# Patient Record
Sex: MALE | Race: WHITE | NOT HISPANIC OR LATINO | Employment: UNEMPLOYED | URBAN - NONMETROPOLITAN AREA
[De-identification: names, ages, dates, MRNs, and addresses within clinical notes are randomized per-mention and may not be internally consistent; named-entity substitution may affect disease eponyms.]

---

## 2018-05-01 DIAGNOSIS — N42.9 DISORDER OF PROSTATE: Primary | ICD-10-CM

## 2018-05-09 ENCOUNTER — OFFICE VISIT (OUTPATIENT)
Dept: UROLOGY | Facility: CLINIC | Age: 73
End: 2018-05-09

## 2018-05-09 VITALS — HEIGHT: 69 IN | BODY MASS INDEX: 34.07 KG/M2 | WEIGHT: 230 LBS

## 2018-05-09 DIAGNOSIS — C61 PROSTATE CANCER (HCC): Primary | ICD-10-CM

## 2018-05-09 PROCEDURE — 99214 OFFICE O/P EST MOD 30 MIN: CPT | Performed by: UROLOGY

## 2018-05-09 RX ORDER — SIMVASTATIN 80 MG
TABLET ORAL
COMMUNITY
Start: 2018-03-02

## 2018-05-09 RX ORDER — LEVOFLOXACIN 500 MG/1
TABLET, FILM COATED ORAL
Qty: 3 TABLET | Refills: 0 | Status: SHIPPED | OUTPATIENT
Start: 2018-05-09 | End: 2019-06-07

## 2018-05-09 RX ORDER — ALPRAZOLAM 0.5 MG/1
TABLET ORAL
Qty: 3 TABLET | Refills: 0 | Status: SHIPPED | OUTPATIENT
Start: 2018-05-09 | End: 2019-06-07

## 2018-05-09 RX ORDER — MECLIZINE HYDROCHLORIDE 25 MG/1
25 TABLET ORAL 3 TIMES DAILY PRN
Refills: 0 | COMMUNITY
Start: 2018-04-17 | End: 2019-06-07

## 2018-05-09 RX ORDER — FINASTERIDE 5 MG/1
5 TABLET, FILM COATED ORAL DAILY
Qty: 90 TABLET | Refills: 3 | Status: SHIPPED | OUTPATIENT
Start: 2018-05-09 | End: 2018-06-08 | Stop reason: SDUPTHER

## 2018-05-09 RX ORDER — EZETIMIBE 10 MG/1
TABLET ORAL
Refills: 0 | COMMUNITY
Start: 2018-03-01

## 2018-05-09 NOTE — PROGRESS NOTES
Chief Complaint:          Prostate cancer    HPI:   73 y.o. male.  Pt has been on aggressive surveillance for prostate cancer   Pt diagnosed with prostate cancer 6 years ago by Dr. Palomino.  His last biopsies 4 years ago were negative for cnacer  His last psa was 7/2016 at 1.44  HPI  Psa 1.1 in may    Past Medical History:        Past Medical History:   Diagnosis Date   • Diabetes    • Gastric ulcer    • Glaucoma    • Hypertension    • Skin cancer          Current Meds:     Current Outpatient Prescriptions   Medication Sig Dispense Refill   • amLODIPine-benazepril (LOTREL) 10-20 MG per capsule      • BYDUREON 2 MG pen-injector      • ezetimibe (ZETIA) 10 MG tablet take 1 tablet by mouth once daily (TAKE WITH SIMVASTATIN)  0   • finasteride (PROSCAR) 5 MG tablet      • meclizine (ANTIVERT) 25 MG tablet Take 25 mg by mouth 3 (Three) Times a Day As Needed for dizziness.  0   • omeprazole (PriLOSEC) 20 MG capsule      • ONGLYZA 5 MG tablet      • pioglitazone (ACTOS) 45 MG tablet      • simvastatin (ZOCOR) 80 MG tablet      • vitamin D (ERGOCALCIFEROL) 91814 UNITS capsule capsule      • VYTORIN 10-80 MG per tablet        No current facility-administered medications for this visit.         Allergies:      No Known Allergies     Past Surgical History:     Past Surgical History:   Procedure Laterality Date   • HAND SURGERY     • HERNIA REPAIR     • NOSE SURGERY           Social History:     Social History     Social History   • Marital status:      Spouse name: N/A   • Number of children: N/A   • Years of education: N/A     Occupational History   • Not on file.     Social History Main Topics   • Smoking status: Never Smoker   • Smokeless tobacco: Never Used   • Alcohol use No   • Drug use: No   • Sexual activity: Not on file     Other Topics Concern   • Not on file     Social History Narrative   • No narrative on file       Family History:     Family History   Problem Relation Age of Onset   • No Known Problems Father     • No Known Problems Mother        Review of Systems:     Review of Systems   Constitutional: Negative for chills, fatigue and fever.   Respiratory: Positive for shortness of breath. Negative for cough and wheezing.    Cardiovascular: Negative for leg swelling.   Gastrointestinal: Negative for abdominal pain, nausea and vomiting.   Musculoskeletal: Negative for back pain and joint swelling.   Neurological: Negative for dizziness and headaches.   Psychiatric/Behavioral: Negative for confusion.       IPSS Questionnaire (AUA-7):  Over the past month…    1)  How often have you had a sensation of not emptying your bladder completely after you finish urinating?  2 - Less than half the time   2)  How often have you had to urinate again less than two hours after you finished urinating? 4 - More than half the time   3)  How often have you found you stopped and started again several times when you urinated?  2 - Less than half the time   4) How difficult have you found it to postpone urination?  0 - Not at all   5) How often have you had a weak urinary stream?  1 - Less than 1 time in 5   6) How often have you had to push or strain to begin urination?  0 - Not at all   7) How many times did you most typically get up to urinate from the time you went to bed until the time you got up in the morning?  1 - 1 time   Total Score:  10     I have reviewed the follow portions of the patient's history and confirmed they are accurate today:  allergies, current medications, past family history, past medical history, past social history, past surgical history, problem list and ROS  Physical Exam:     Physical Exam   Constitutional: He is oriented to person, place, and time.   HENT:   Head: Normocephalic and atraumatic.   Right Ear: External ear normal.   Left Ear: External ear normal.   Nose: Nose normal.   Mouth/Throat: Oropharynx is clear and moist.   Eyes: Conjunctivae and EOM are normal. Pupils are equal, round, and reactive to  "light.   Neck: Normal range of motion. Neck supple. No thyromegaly present.   Cardiovascular: Normal rate, regular rhythm, normal heart sounds and intact distal pulses.    No murmur heard.  Pulmonary/Chest: Effort normal and breath sounds normal. No respiratory distress. He has no wheezes. He has no rales. He exhibits no tenderness.   Abdominal: Soft. Bowel sounds are normal. He exhibits no distension and no mass. There is no tenderness. No hernia.   Genitourinary: Rectum normal, prostate normal and penis normal.   Genitourinary Comments: No nodules   Musculoskeletal: Normal range of motion. He exhibits no edema or tenderness.   Lymphadenopathy:     He has no cervical adenopathy.   Neurological: He is alert and oriented to person, place, and time. No cranial nerve deficit. He exhibits normal muscle tone. Coordination normal.   Skin: Skin is warm. No rash noted.   Psychiatric: He has a normal mood and affect. His behavior is normal. Judgment and thought content normal.   Nursing note and vitals reviewed.      Ht 175.3 cm (69\")   Wt 104 kg (230 lb)   BMI 33.97 kg/m²    Procedure:         Assessment:     Encounter Diagnosis   Name Primary?   • Prostate cancer Yes     Orders Placed This Encounter   Procedures   • PSA DIAGNOSTIC     Standing Status:   Future     Standing Expiration Date:   5/9/2019       Plan:   I would recommend psa today and it is time to repeat his prostate ultrasound and biopsies.    Patient's Body mass index is 33.97 kg/m². BMI is above normal parameters. Recommendations include: educational material.      Counseling was given to patient for the following topics instructions for management as follows: prostate cancer. The interim medical history and current results were reviewed.  A treatment plan with follow-up was made for Prostate cancer [C61]. I spent 31 minutes face to face with Troy Murguia and 70 percentage was spent in counseling.    This document has been electronically signed by Bob " MD Mehrdad May 9, 2018 1:26 PM

## 2018-05-14 ENCOUNTER — RESULTS ENCOUNTER (OUTPATIENT)
Dept: UROLOGY | Facility: CLINIC | Age: 73
End: 2018-05-14

## 2018-05-14 DIAGNOSIS — C61 PROSTATE CANCER (HCC): ICD-10-CM

## 2018-06-01 ENCOUNTER — PROCEDURE VISIT (OUTPATIENT)
Dept: UROLOGY | Facility: CLINIC | Age: 73
End: 2018-06-01

## 2018-06-01 VITALS — HEIGHT: 69 IN | BODY MASS INDEX: 34.07 KG/M2 | WEIGHT: 230 LBS

## 2018-06-01 DIAGNOSIS — C61 PROSTATE CANCER (HCC): ICD-10-CM

## 2018-06-01 DIAGNOSIS — Z48.816 SURGICAL AFTERCARE, GENITOURINARY SYSTEM: Primary | ICD-10-CM

## 2018-06-01 PROCEDURE — 76872 US TRANSRECTAL: CPT | Performed by: UROLOGY

## 2018-06-01 PROCEDURE — 96372 THER/PROPH/DIAG INJ SC/IM: CPT | Performed by: UROLOGY

## 2018-06-01 PROCEDURE — 55700 PR PROSTATE NEEDLE BIOPSY ANY APPROACH: CPT | Performed by: UROLOGY

## 2018-06-01 PROCEDURE — 76942 ECHO GUIDE FOR BIOPSY: CPT | Performed by: UROLOGY

## 2018-06-01 RX ORDER — CEFTRIAXONE 1 G/1
1 INJECTION, POWDER, FOR SOLUTION INTRAMUSCULAR; INTRAVENOUS ONCE
Status: COMPLETED | OUTPATIENT
Start: 2018-06-01 | End: 2018-06-01

## 2018-06-01 RX ADMIN — CEFTRIAXONE 1 G: 1 INJECTION, POWDER, FOR SOLUTION INTRAMUSCULAR; INTRAVENOUS at 13:12

## 2018-06-01 NOTE — PROGRESS NOTES
Chief Complaint:          Prostate cancer    HPI:   73 y.o. male.    HPI  The patient is here for prostate ultrasound and prostate biopsies for regular surveillance of his localized prostate cancer.  Pt diagnosed with prostate cancer 6 years ago by Dr. Palomino.  His last biopsies 4 years ago were negative for cnacer  His last psa was 7/2016 at 1.44  Psa 1.1 in may    Past Medical History:        Past Medical History:   Diagnosis Date   • Diabetes    • Gastric ulcer    • Glaucoma    • Hypertension    • Skin cancer          Current Meds:     Current Outpatient Prescriptions   Medication Sig Dispense Refill   • ALPRAZolam (XANAX) 0.5 MG tablet Bring to office for procedure. Staff will instruct on dose and timing. 3 tablet 0   • amLODIPine-benazepril (LOTREL) 10-20 MG per capsule      • BYDUREON 2 MG pen-injector      • ezetimibe (ZETIA) 10 MG tablet take 1 tablet by mouth once daily (TAKE WITH SIMVASTATIN)  0   • finasteride (PROSCAR) 5 MG tablet Take 1 tablet by mouth Daily. 90 tablet 3   • levoFLOXacin (LEVAQUIN) 500 MG tablet Take 1 tablet by mouth daily for three days starting the day prior to procedure. 3 tablet 0   • meclizine (ANTIVERT) 25 MG tablet Take 25 mg by mouth 3 (Three) Times a Day As Needed for dizziness.  0   • omeprazole (PriLOSEC) 20 MG capsule      • ONGLYZA 5 MG tablet      • pioglitazone (ACTOS) 45 MG tablet      • simvastatin (ZOCOR) 80 MG tablet      • vitamin D (ERGOCALCIFEROL) 34053 UNITS capsule capsule      • VYTORIN 10-80 MG per tablet        No current facility-administered medications for this visit.         Allergies:      No Known Allergies     Past Surgical History:     Past Surgical History:   Procedure Laterality Date   • HAND SURGERY     • HERNIA REPAIR     • NOSE SURGERY           Social History:     Social History     Social History   • Marital status:      Spouse name: N/A   • Number of children: N/A   • Years of education: N/A     Occupational History   • Not on file.      Social History Main Topics   • Smoking status: Never Smoker   • Smokeless tobacco: Never Used   • Alcohol use No   • Drug use: No   • Sexual activity: Not on file     Other Topics Concern   • Not on file     Social History Narrative   • No narrative on file       Family History:     Family History   Problem Relation Age of Onset   • No Known Problems Father    • No Known Problems Mother        Review of Systems:     Review of Systems   Constitutional: Negative for chills and fever.   Respiratory: Negative for chest tightness, shortness of breath and wheezing.    Cardiovascular: Negative for chest pain.   Gastrointestinal: Negative for abdominal pain, nausea and vomiting.   Genitourinary: Negative for difficulty urinating, flank pain, frequency and urgency.   Musculoskeletal: Negative for back pain.   Neurological: Negative for dizziness, weakness and numbness.   Psychiatric/Behavioral: Negative for confusion.       I have reviewed the follow portions of the patient's history and confirmed they are accurate today:  allergies, current medications, past family history, past medical history, past social history, past surgical history, problem list and ROS  Physical Exam:     Physical Exam   Constitutional: He is oriented to person, place, and time.   HENT:   Head: Normocephalic and atraumatic.   Right Ear: External ear normal.   Left Ear: External ear normal.   Nose: Nose normal.   Mouth/Throat: Oropharynx is clear and moist.   Eyes: Conjunctivae and EOM are normal. Pupils are equal, round, and reactive to light.   Neck: Normal range of motion. Neck supple. No thyromegaly present.   Cardiovascular: Normal rate, regular rhythm, normal heart sounds and intact distal pulses.    No murmur heard.  Pulmonary/Chest: Effort normal and breath sounds normal. No respiratory distress. He has no wheezes. He has no rales. He exhibits no tenderness.   Abdominal: Soft. Bowel sounds are normal. He exhibits no distension and no mass.  "There is no tenderness. No hernia.   Genitourinary: Rectum normal, prostate normal and penis normal.   Musculoskeletal: Normal range of motion. He exhibits no edema or tenderness.   Lymphadenopathy:     He has no cervical adenopathy.   Neurological: He is alert and oriented to person, place, and time. No cranial nerve deficit. He exhibits normal muscle tone. Coordination normal.   Skin: Skin is warm. No rash noted.   Psychiatric: He has a normal mood and affect. His behavior is normal. Judgment and thought content normal.   Nursing note and vitals reviewed.      Ht 175.3 cm (69.02\")   Wt 104 kg (230 lb)   BMI 33.95 kg/m²    Procedure:   Procedure: Transrectal Ultrasound and Guided Biopsies    Position: Fetal/left lateral decubitus    Prostate Ultrasound Guided lidocaine prostate block    Sedation: Oral xanax    Indications: Stage TIc prostate cancer    Procedures risks and benefits and risk and alternatives were discussed with the patient. Written Consent was obtained prior to procedure.    In patients without penicillin allergies, preoperative Oral antibiotics and pre-procedure rocephin administered.    Procedure details: 8 Mhz biplanar B&K probe was placed in the rectum. Prostate was scanned from the base of the bladder to the apex. Prostate size was measured at 45.4 cc prosthetic height 35.8  mm   50.42 width 48.4 mm   Length 48.4 mm    Seminal vesicals: normal  Prostate median lobe normal    Patient is to return in 1  week to discuss pathology.          Assessment:     Encounter Diagnoses   Name Primary?   • Surgical aftercare, genitourinary system Yes   • Prostate cancer      No orders of the defined types were placed in this encounter.      Plan:   See him back in a week to discuss his pathology    Patient's Body mass index is 33.95 kg/m². BMI is above normal parameters. Recommendations include: educational material.      Counseling was given to patient for the following topics diagnostic results including: " Prostate ultrasound. The interim medical history and current results were reviewed.  A treatment plan with follow-up was made for Surgical aftercare, genitourinary system [Z48.816].This document has been electronically signed by Bob Cronin MD June 1, 2018 2:05 PM

## 2018-06-08 ENCOUNTER — OFFICE VISIT (OUTPATIENT)
Dept: UROLOGY | Facility: CLINIC | Age: 73
End: 2018-06-08

## 2018-06-08 VITALS — HEIGHT: 69 IN | BODY MASS INDEX: 33.96 KG/M2 | WEIGHT: 229.28 LBS

## 2018-06-08 DIAGNOSIS — C61 PROSTATE CANCER (HCC): Primary | ICD-10-CM

## 2018-06-08 PROCEDURE — 99214 OFFICE O/P EST MOD 30 MIN: CPT | Performed by: UROLOGY

## 2018-06-08 RX ORDER — FINASTERIDE 5 MG/1
5 TABLET, FILM COATED ORAL DAILY
Qty: 90 TABLET | Refills: 3 | Status: SHIPPED | OUTPATIENT
Start: 2018-06-08 | End: 2019-06-07 | Stop reason: SDUPTHER

## 2018-06-08 NOTE — PROGRESS NOTES
Chief Complaint:          Prostate cancer    HPI:   73 y.o. male.  Pt has been diagnosed with small amount of prostate cancer 6 years ago.  His biopsies the second time with us that have not shown any cancer.  His psa 1.1 the last time.  Pt is on finasteride.  HPI      Past Medical History:        Past Medical History:   Diagnosis Date   • Diabetes    • Gastric ulcer    • Glaucoma    • Hypertension    • Skin cancer          Current Meds:     Current Outpatient Prescriptions   Medication Sig Dispense Refill   • ALPRAZolam (XANAX) 0.5 MG tablet Bring to office for procedure. Staff will instruct on dose and timing. 3 tablet 0   • amLODIPine-benazepril (LOTREL) 10-20 MG per capsule      • BYDUREON 2 MG pen-injector      • ezetimibe (ZETIA) 10 MG tablet take 1 tablet by mouth once daily (TAKE WITH SIMVASTATIN)  0   • finasteride (PROSCAR) 5 MG tablet Take 1 tablet by mouth Daily. 90 tablet 3   • levoFLOXacin (LEVAQUIN) 500 MG tablet Take 1 tablet by mouth daily for three days starting the day prior to procedure. 3 tablet 0   • meclizine (ANTIVERT) 25 MG tablet Take 25 mg by mouth 3 (Three) Times a Day As Needed for dizziness.  0   • omeprazole (PriLOSEC) 20 MG capsule      • ONGLYZA 5 MG tablet      • pioglitazone (ACTOS) 45 MG tablet      • simvastatin (ZOCOR) 80 MG tablet      • vitamin D (ERGOCALCIFEROL) 72263 UNITS capsule capsule      • VYTORIN 10-80 MG per tablet        No current facility-administered medications for this visit.         Allergies:      No Known Allergies     Past Surgical History:     Past Surgical History:   Procedure Laterality Date   • HAND SURGERY     • HERNIA REPAIR     • NOSE SURGERY           Social History:     Social History     Social History   • Marital status:      Spouse name: N/A   • Number of children: N/A   • Years of education: N/A     Occupational History   • Not on file.     Social History Main Topics   • Smoking status: Never Smoker   • Smokeless tobacco: Never Used   •  Alcohol use No   • Drug use: No   • Sexual activity: Not on file     Other Topics Concern   • Not on file     Social History Narrative   • No narrative on file       Family History:     Family History   Problem Relation Age of Onset   • No Known Problems Father    • No Known Problems Mother        Review of Systems:     Review of Systems   Constitutional: Negative for chills and fever.   HENT: Negative for congestion and sinus pressure.    Respiratory: Negative for chest tightness and shortness of breath.    Cardiovascular: Negative for chest pain and palpitations.   Gastrointestinal: Negative for constipation, nausea and vomiting.   Genitourinary: Negative for flank pain and penile pain.   Neurological: Negative for headaches.   Psychiatric/Behavioral: Negative for confusion.           I have reviewed the follow portions of the patient's history and confirmed they are accurate today:  allergies, current medications, past family history, past medical history, past social history, past surgical history, problem list and ROS  Physical Exam:     Physical Exam   Constitutional: He is oriented to person, place, and time.   HENT:   Head: Normocephalic and atraumatic.   Right Ear: External ear normal.   Left Ear: External ear normal.   Nose: Nose normal.   Mouth/Throat: Oropharynx is clear and moist.   Eyes: Conjunctivae and EOM are normal. Pupils are equal, round, and reactive to light.   Neck: Normal range of motion. Neck supple. No thyromegaly present.   Cardiovascular: Normal rate, regular rhythm, normal heart sounds and intact distal pulses.    No murmur heard.  Pulmonary/Chest: Effort normal and breath sounds normal. No respiratory distress. He has no wheezes. He has no rales. He exhibits no tenderness.   Abdominal: Soft. Bowel sounds are normal. He exhibits no distension and no mass. There is no tenderness. No hernia.   Genitourinary: Rectum normal, prostate normal and penis normal.   Musculoskeletal: Normal range  "of motion. He exhibits no edema or tenderness.   Lymphadenopathy:     He has no cervical adenopathy.   Neurological: He is alert and oriented to person, place, and time. No cranial nerve deficit. He exhibits normal muscle tone. Coordination normal.   Skin: Skin is warm. No rash noted.   Psychiatric: He has a normal mood and affect. His behavior is normal. Judgment and thought content normal.   Nursing note and vitals reviewed.      Ht 175.3 cm (69.02\")   Wt 104 kg (229 lb 4.5 oz)   BMI 33.84 kg/m²    Procedure:         Assessment:     Encounter Diagnosis   Name Primary?   • Prostate cancer Yes     No orders of the defined types were placed in this encounter.      Plan:   Will see pt back in a year with a psa prior    Patient's Body mass index is 33.84 kg/m². BMI is above normal parameters. Recommendations include: educational material.      Counseling was given to patient for the following topics diagnostic results including: biopsies. The interim medical history and current results were reviewed.  A treatment plan with follow-up was made for Prostate cancer [C61].This document has been electronically signed by Bob Cronin MD June 8, 2018 3:50 PM  "

## 2019-05-20 ENCOUNTER — LAB (OUTPATIENT)
Dept: UROLOGY | Facility: CLINIC | Age: 74
End: 2019-05-20

## 2019-05-20 DIAGNOSIS — C61 PROSTATE CANCER (HCC): ICD-10-CM

## 2019-05-20 PROCEDURE — 36415 COLL VENOUS BLD VENIPUNCTURE: CPT | Performed by: UROLOGY

## 2019-05-21 LAB — PSA SERPL-MCNC: 1.59 NG/ML (ref 0–4)

## 2019-06-07 ENCOUNTER — OFFICE VISIT (OUTPATIENT)
Dept: UROLOGY | Facility: CLINIC | Age: 74
End: 2019-06-07

## 2019-06-07 VITALS — HEIGHT: 69 IN | WEIGHT: 229 LBS | BODY MASS INDEX: 33.92 KG/M2

## 2019-06-07 DIAGNOSIS — C61 PROSTATE CANCER (HCC): ICD-10-CM

## 2019-06-07 PROCEDURE — 99213 OFFICE O/P EST LOW 20 MIN: CPT | Performed by: UROLOGY

## 2019-06-07 RX ORDER — FINASTERIDE 5 MG/1
5 TABLET, FILM COATED ORAL DAILY
Qty: 90 TABLET | Refills: 3 | Status: SHIPPED | OUTPATIENT
Start: 2019-06-07 | End: 2020-06-30 | Stop reason: SDUPTHER

## 2019-06-07 RX ORDER — ASPIRIN 81 MG/1
81 TABLET ORAL DAILY
COMMUNITY

## 2019-06-07 NOTE — PROGRESS NOTES
Chief Complaint:          Prostate cancer    HPI:   74 y.o. male.  Pt has been diagnosed with small amount of prostate cancer 6 years ago.  His biopsies the second time with us that have not shown any cancer.  His psa 1.1 the last time.  Pt is on finasteride.  His psa is 1.59.      HPI      Past Medical History:        Past Medical History:   Diagnosis Date   • Diabetes (CMS/HCC)    • Gastric ulcer    • Glaucoma    • Hypertension    • Skin cancer          Current Meds:     Current Outpatient Medications   Medication Sig Dispense Refill   • amLODIPine-benazepril (LOTREL) 10-20 MG per capsule      • aspirin 81 MG EC tablet Take 81 mg by mouth Daily.     • BYDUREON 2 MG pen-injector      • ezetimibe (ZETIA) 10 MG tablet take 1 tablet by mouth once daily (TAKE WITH SIMVASTATIN)  0   • finasteride (PROSCAR) 5 MG tablet Take 1 tablet by mouth Daily. 90 tablet 3   • omeprazole (PriLOSEC) 20 MG capsule      • ONGLYZA 5 MG tablet      • pioglitazone (ACTOS) 45 MG tablet      • simvastatin (ZOCOR) 80 MG tablet      • vitamin D (ERGOCALCIFEROL) 60238 UNITS capsule capsule        No current facility-administered medications for this visit.         Allergies:      No Known Allergies     Past Surgical History:     Past Surgical History:   Procedure Laterality Date   • HAND SURGERY     • HERNIA REPAIR     • NOSE SURGERY           Social History:     Social History     Socioeconomic History   • Marital status:      Spouse name: Not on file   • Number of children: Not on file   • Years of education: Not on file   • Highest education level: Not on file   Tobacco Use   • Smoking status: Never Smoker   • Smokeless tobacco: Never Used   Substance and Sexual Activity   • Alcohol use: No   • Drug use: No       Family History:     Family History   Problem Relation Age of Onset   • No Known Problems Father    • No Known Problems Mother        Review of Systems:     Review of Systems   Constitutional: Negative for chills, fatigue and  fever.   HENT: Negative for congestion and sinus pressure.    Respiratory: Negative for shortness of breath.    Cardiovascular: Negative for chest pain.   Gastrointestinal: Negative for abdominal pain, constipation, diarrhea, nausea and vomiting.   Genitourinary: Negative for frequency and urgency.   Musculoskeletal: Negative for back pain and neck pain.   Neurological: Negative for dizziness and headaches.   Hematological: Does not bruise/bleed easily.   Psychiatric/Behavioral: The patient is not nervous/anxious.          I have reviewed the follow portions of the patient's history and confirmed they are accurate today:  allergies, current medications, past family history, past medical history, past social history, past surgical history, problem list and ROS  Physical Exam:     Physical Exam   Constitutional: He is oriented to person, place, and time.   HENT:   Head: Normocephalic and atraumatic.   Right Ear: External ear normal.   Left Ear: External ear normal.   Nose: Nose normal.   Mouth/Throat: Oropharynx is clear and moist.   Eyes: Conjunctivae and EOM are normal. Pupils are equal, round, and reactive to light.   Neck: Normal range of motion. Neck supple. No thyromegaly present.   Cardiovascular: Normal rate, regular rhythm, normal heart sounds and intact distal pulses.   No murmur heard.  Pulmonary/Chest: Effort normal and breath sounds normal. No respiratory distress. He has no wheezes. He has no rales. He exhibits no tenderness.   Abdominal: Soft. Bowel sounds are normal. He exhibits no distension and no mass. There is no tenderness. No hernia.   Genitourinary: Rectum normal, prostate normal and penis normal.   Musculoskeletal: Normal range of motion. He exhibits no edema or tenderness.   Lymphadenopathy:     He has no cervical adenopathy.   Neurological: He is alert and oriented to person, place, and time. No cranial nerve deficit. He exhibits normal muscle tone. Coordination normal.   Skin: Skin is warm.  "No rash noted.   Psychiatric: He has a normal mood and affect. His behavior is normal. Judgment and thought content normal.   Nursing note and vitals reviewed.      Ht 175.3 cm (69\")   Wt 104 kg (229 lb)   BMI 33.82 kg/m²    Procedure:         Assessment:     Encounter Diagnosis   Name Primary?   • Prostate cancer (CMS/[facility])        Orders Placed This Encounter   Procedures   • PSA DIAGNOSTIC     Standing Status:   Future     Standing Expiration Date:   6/7/2020       Patient reports that he is not currently experiencing any symptoms of urinary incontinence.      Plan:   Will see pt back in a year.    Patient's Body mass index is 33.82 kg/m². BMI is within normal parameters. No follow-up required..    Counseling was given to patient for the following topics diagnostic results including: psa. The interim medical history and current results were reviewed.  A treatment plan with follow-up was made for No primary diagnosis found..     This document has been electronically signed by Bob Cronin MD June 7, 2019 3:57 PM      "

## 2020-06-30 ENCOUNTER — OFFICE VISIT (OUTPATIENT)
Dept: UROLOGY | Facility: CLINIC | Age: 75
End: 2020-06-30

## 2020-06-30 VITALS — HEIGHT: 69 IN | TEMPERATURE: 97.9 F | WEIGHT: 220 LBS | BODY MASS INDEX: 32.58 KG/M2

## 2020-06-30 DIAGNOSIS — R35.0 FREQUENT URINATION: ICD-10-CM

## 2020-06-30 DIAGNOSIS — C61 PROSTATE CANCER (HCC): Primary | ICD-10-CM

## 2020-06-30 LAB
BILIRUB BLD-MCNC: NEGATIVE MG/DL
CLARITY, POC: CLEAR
COLOR UR: YELLOW
GLUCOSE UR STRIP-MCNC: NEGATIVE MG/DL
KETONES UR QL: NEGATIVE
LEUKOCYTE EST, POC: ABNORMAL
NITRITE UR-MCNC: NEGATIVE MG/ML
PH UR: 5 [PH] (ref 5–8)
PROT UR STRIP-MCNC: ABNORMAL MG/DL
RBC # UR STRIP: NEGATIVE /UL
SP GR UR: 1.03 (ref 1–1.03)
UROBILINOGEN UR QL: NORMAL

## 2020-06-30 PROCEDURE — 99214 OFFICE O/P EST MOD 30 MIN: CPT | Performed by: UROLOGY

## 2020-06-30 PROCEDURE — 36415 COLL VENOUS BLD VENIPUNCTURE: CPT | Performed by: UROLOGY

## 2020-06-30 PROCEDURE — 81003 URINALYSIS AUTO W/O SCOPE: CPT | Performed by: UROLOGY

## 2020-06-30 RX ORDER — FINASTERIDE 5 MG/1
5 TABLET, FILM COATED ORAL DAILY
Qty: 90 TABLET | Refills: 3 | Status: SHIPPED | OUTPATIENT
Start: 2020-06-30 | End: 2021-06-29 | Stop reason: SDUPTHER

## 2020-06-30 NOTE — PROGRESS NOTES
Chief Complaint:          Prostate Cancer    HPI:   75 y.o. male.  Pt has not had a psa since last year when it was 1.59.    Pt has been diagnosed with small amount of prostate cancer 6 years ago.  His biopsies the second time with us that have not shown any cancer.  His psa 1.1 the last time.  Pt is on finasteride.  His psa is 1.59.  HPI  He has no pain and nocturia x 1 and his urine shows a trace leukocyte esterase.    Past Medical History:        Past Medical History:   Diagnosis Date   • Diabetes (CMS/HCC)    • Gastric ulcer    • Glaucoma    • Hypertension    • Skin cancer          Current Meds:     Current Outpatient Medications   Medication Sig Dispense Refill   • amLODIPine-benazepril (LOTREL) 10-20 MG per capsule      • aspirin 81 MG EC tablet Take 81 mg by mouth Daily.     • BYDUREON 2 MG pen-injector      • ezetimibe (ZETIA) 10 MG tablet take 1 tablet by mouth once daily (TAKE WITH SIMVASTATIN)  0   • finasteride (PROSCAR) 5 MG tablet Take 1 tablet by mouth Daily. 90 tablet 3   • omeprazole (PriLOSEC) 20 MG capsule      • ONGLYZA 5 MG tablet      • pioglitazone (ACTOS) 45 MG tablet      • simvastatin (ZOCOR) 80 MG tablet      • vitamin D (ERGOCALCIFEROL) 78489 UNITS capsule capsule        No current facility-administered medications for this visit.         Allergies:      No Known Allergies     Past Surgical History:     Past Surgical History:   Procedure Laterality Date   • HAND SURGERY     • HERNIA REPAIR     • NOSE SURGERY           Social History:     Social History     Socioeconomic History   • Marital status:      Spouse name: Not on file   • Number of children: Not on file   • Years of education: Not on file   • Highest education level: Not on file   Tobacco Use   • Smoking status: Never Smoker   • Smokeless tobacco: Never Used   Substance and Sexual Activity   • Alcohol use: No   • Drug use: No       Family History:     Family History   Problem Relation Age of Onset   • No Known Problems  Father    • No Known Problems Mother        Review of Systems:     Review of Systems   Constitutional: Negative.    HENT: Negative.    Eyes: Negative.    Respiratory: Negative.    Cardiovascular: Negative.    Gastrointestinal: Negative.    Endocrine: Negative.    Genitourinary: Negative.  Negative for decreased urine volume, difficulty urinating, discharge, dysuria, enuresis, flank pain, frequency, genital sores, hematuria, penile pain, penile swelling, scrotal swelling, testicular pain and urgency.   Musculoskeletal: Negative.    Skin: Negative.    Allergic/Immunologic: Negative.    Neurological: Negative.    Hematological: Negative.    Psychiatric/Behavioral: Negative.        IPSS Questionnaire (AUA-7):  Over the past month…    1)  Incomplete Emptying  How often have you had a sensation of not emptying your bladder?  0 - Not at all   2)  Frequency  How often have you had to urinate less than every two hours? 0 - Not at all   3)  Intermittency  How often have you found you stopped and started again several times when you urinated?  0 - Not at all   4) Urgency  How often have you found it difficult to postpone urination?  0 - Not at all   5) Weak Stream  How often have you had a weak urinary stream?  0 - Not at all   6) Straining  How often have you had to push or strain to begin urination?  0 - Not at all   7) Nocturia  How many times did you typically get up at night to urinate?  1 - 1 time   Total Score:  1       Quality of life due to urinary symptoms:  If you were to spend the rest of your life with your urinary condition the way it is now, how would you feel about that? 0-Delighted   Urine Leakage (Incontinence) 0-No Leakage       I have reviewed the follow portions of the patient's history and confirmed they are accurate today:  allergies, current medications, past family history, past medical history, past social history, past surgical history, problem list and ROS  Physical Exam:     Physical Exam  "  Constitutional: He is oriented to person, place, and time.   HENT:   Head: Normocephalic and atraumatic.   Right Ear: External ear normal.   Left Ear: External ear normal.   Nose: Nose normal.   Mouth/Throat: Oropharynx is clear and moist.   Eyes: Pupils are equal, round, and reactive to light. Conjunctivae and EOM are normal.   Neck: Normal range of motion. Neck supple. No thyromegaly present.   Cardiovascular: Normal rate, regular rhythm, normal heart sounds and intact distal pulses.   No murmur heard.  Pulmonary/Chest: Effort normal and breath sounds normal. No respiratory distress. He has no wheezes. He has no rales. He exhibits no tenderness.   Abdominal: Soft. Bowel sounds are normal. He exhibits no distension and no mass. There is no tenderness. No hernia.   Genitourinary: Rectum normal, prostate normal and penis normal.   Genitourinary Comments: No nodules   Musculoskeletal: Normal range of motion. He exhibits no edema or tenderness.   Lymphadenopathy:     He has no cervical adenopathy.   Neurological: He is alert and oriented to person, place, and time. No cranial nerve deficit. He exhibits normal muscle tone. Coordination normal.   Skin: Skin is warm. No rash noted.   Psychiatric: He has a normal mood and affect. His behavior is normal. Judgment and thought content normal.   Nursing note and vitals reviewed.      Temp 97.9 °F (36.6 °C)   Ht 175.3 cm (69\")   Wt 99.8 kg (220 lb)   BMI 32.49 kg/m²    Procedure:         Assessment:     Encounter Diagnoses   Name Primary?   • Prostate cancer (CMS/[facility]) Yes   • Frequent urination         Orders Placed This Encounter   Procedures   • Urine Culture - Urine, Urine, Random Void   • POC Urinalysis Dipstick, Automated       Patient reports that he is not currently experiencing any symptoms of urinary incontinence.      Plan:   Will check his psa today.  Will refill his medicatioins.    Patient's Body mass index is 32.49 kg/m². BMI is above normal parameters. " Recommendations include: referral to primary care.      Smoking Cessation Counseling:  Former smoker.  Patient does not currently use any tobacco products.   Counseling was given to patient for the following topics impressions as follows: prostate cancer. The interim medical history and current results were reviewed.  A treatment plan with follow-up was made for Prostate cancer (CMS/[facility]) [C61]. I spent 25 minutes face to face with Troy Murguia and 80 percentage was spent in counseling.       This document has been electronically signed by oBb Cronin MD June 30, 2020 11:12

## 2020-07-01 LAB — PSA SERPL-MCNC: 1.52 NG/ML (ref 0–4)

## 2020-07-03 LAB
BACTERIA UR CULT: NO GROWTH
BACTERIA UR CULT: NORMAL

## 2021-02-12 DIAGNOSIS — Z23 IMMUNIZATION DUE: ICD-10-CM

## 2021-06-16 ENCOUNTER — LAB (OUTPATIENT)
Dept: UROLOGY | Facility: CLINIC | Age: 76
End: 2021-06-16

## 2021-06-16 DIAGNOSIS — C61 PROSTATE CANCER (HCC): Primary | ICD-10-CM

## 2021-06-16 PROCEDURE — 36415 COLL VENOUS BLD VENIPUNCTURE: CPT | Performed by: UROLOGY

## 2021-06-17 LAB — PSA SERPL-MCNC: 2 NG/ML (ref 0–4)

## 2021-06-29 ENCOUNTER — OFFICE VISIT (OUTPATIENT)
Dept: UROLOGY | Facility: CLINIC | Age: 76
End: 2021-06-29

## 2021-06-29 VITALS — HEIGHT: 69 IN | BODY MASS INDEX: 32.59 KG/M2 | WEIGHT: 220.02 LBS

## 2021-06-29 DIAGNOSIS — Z77.098 AGENT ORANGE EXPOSURE: ICD-10-CM

## 2021-06-29 DIAGNOSIS — C61 PROSTATE CANCER (HCC): ICD-10-CM

## 2021-06-29 DIAGNOSIS — R97.20 ELEVATED PROSTATE SPECIFIC ANTIGEN (PSA): Primary | ICD-10-CM

## 2021-06-29 PROCEDURE — 99214 OFFICE O/P EST MOD 30 MIN: CPT | Performed by: UROLOGY

## 2021-06-29 RX ORDER — FINASTERIDE 5 MG/1
5 TABLET, FILM COATED ORAL DAILY
Qty: 90 TABLET | Refills: 3 | Status: SHIPPED | OUTPATIENT
Start: 2021-06-29 | End: 2022-06-28 | Stop reason: SDUPTHER

## 2021-07-01 PROBLEM — Z77.098 AGENT ORANGE EXPOSURE: Status: ACTIVE | Noted: 2021-07-01

## 2022-06-06 ENCOUNTER — LAB (OUTPATIENT)
Dept: UROLOGY | Facility: CLINIC | Age: 77
End: 2022-06-06

## 2022-06-06 DIAGNOSIS — C61 PROSTATE CANCER: Primary | ICD-10-CM

## 2022-06-06 LAB — PSA SERPL-MCNC: 1.35 NG/ML (ref 0–4)

## 2022-06-28 ENCOUNTER — OFFICE VISIT (OUTPATIENT)
Dept: UROLOGY | Facility: CLINIC | Age: 77
End: 2022-06-28

## 2022-06-28 VITALS — BODY MASS INDEX: 32.59 KG/M2 | WEIGHT: 220.02 LBS | HEIGHT: 69 IN

## 2022-06-28 DIAGNOSIS — C61 MALIGNANT NEOPLASM OF PROSTATE: Primary | ICD-10-CM

## 2022-06-28 DIAGNOSIS — C61 PROSTATE CANCER: ICD-10-CM

## 2022-06-28 PROCEDURE — 99213 OFFICE O/P EST LOW 20 MIN: CPT | Performed by: UROLOGY

## 2022-06-28 RX ORDER — FINASTERIDE 5 MG/1
5 TABLET, FILM COATED ORAL DAILY
Qty: 90 TABLET | Refills: 3 | Status: SHIPPED | OUTPATIENT
Start: 2022-06-28

## 2024-06-24 ENCOUNTER — TELEPHONE (OUTPATIENT)
Dept: UROLOGY | Facility: CLINIC | Age: 79
End: 2024-06-24
Payer: MEDICARE

## 2024-06-24 DIAGNOSIS — C61 PROSTATE CANCER: Primary | ICD-10-CM

## 2024-06-24 NOTE — TELEPHONE ENCOUNTER
The patient starting going to another urologist cause we didn't take his insurance but er do know so he will be here in October for his yearly f/u. He wants to come in prior to that for his PSA. Can we purt in an order or however that works for this.

## 2024-10-21 ENCOUNTER — LAB (OUTPATIENT)
Dept: UROLOGY | Facility: CLINIC | Age: 79
End: 2024-10-21
Payer: MEDICARE

## 2024-10-21 DIAGNOSIS — C61 PROSTATE CANCER: ICD-10-CM

## 2024-10-22 LAB — PSA SERPL-MCNC: 2.39 NG/ML (ref 0–4)

## 2024-12-31 ENCOUNTER — OFFICE VISIT (OUTPATIENT)
Dept: UROLOGY | Facility: CLINIC | Age: 79
End: 2024-12-31
Payer: MEDICARE

## 2024-12-31 VITALS
HEIGHT: 69 IN | BODY MASS INDEX: 30.07 KG/M2 | HEART RATE: 71 BPM | WEIGHT: 203 LBS | SYSTOLIC BLOOD PRESSURE: 123 MMHG | DIASTOLIC BLOOD PRESSURE: 74 MMHG

## 2024-12-31 DIAGNOSIS — C61 PROSTATE CANCER: ICD-10-CM

## 2024-12-31 PROCEDURE — 99213 OFFICE O/P EST LOW 20 MIN: CPT | Performed by: UROLOGY

## 2024-12-31 PROCEDURE — 1159F MED LIST DOCD IN RCRD: CPT | Performed by: UROLOGY

## 2024-12-31 PROCEDURE — 1160F RVW MEDS BY RX/DR IN RCRD: CPT | Performed by: UROLOGY

## 2024-12-31 RX ORDER — AMLODIPINE BESYLATE 10 MG/1
10 TABLET ORAL DAILY
COMMUNITY
Start: 2014-12-03

## 2024-12-31 RX ORDER — TAMSULOSIN HYDROCHLORIDE 0.4 MG/1
1 CAPSULE ORAL
COMMUNITY
Start: 2024-12-05 | End: 2024-12-31 | Stop reason: SDUPTHER

## 2024-12-31 RX ORDER — FINASTERIDE 5 MG/1
5 TABLET, FILM COATED ORAL DAILY
Qty: 90 TABLET | Refills: 3 | Status: SHIPPED | OUTPATIENT
Start: 2024-12-31

## 2024-12-31 RX ORDER — TAMSULOSIN HYDROCHLORIDE 0.4 MG/1
1 CAPSULE ORAL DAILY
Qty: 90 CAPSULE | Refills: 3 | Status: SHIPPED | OUTPATIENT
Start: 2024-12-31

## 2024-12-31 RX ORDER — TIZANIDINE 2 MG/1
2 TABLET ORAL
COMMUNITY
Start: 2024-11-15

## 2024-12-31 NOTE — PROGRESS NOTES
Chief Complaint:      Chief Complaint   Patient presents with    Prostate Cancer       HPI:   79 y.o. male here for 1 year prostate cancer check back in October his PSA was 2.39.  He has no changes.  He does not need refills.  He reports no lower urinary tract symptomatology, particularly irritative symptoms such as frequency, urgency, dysuria, and obstructive symptomatology, particularly dribbling, hesitancy, and intermittency.  [facility]  He has heavy agent orange exposure.  I will see him back in 6 months.    Past Medical History:     Past Medical History:   Diagnosis Date    Diabetes     Gastric ulcer     Glaucoma     Hypertension     Skin cancer        Current Meds:     Current Outpatient Medications   Medication Sig Dispense Refill    amLODIPine (NORVASC) 10 MG tablet Take 1 tablet by mouth Daily.      amLODIPine-benazepril (LOTREL) 10-20 MG per capsule       aspirin 81 MG EC tablet Take 1 tablet by mouth Daily.      BYDUREON 2 MG pen-injector       ezetimibe (ZETIA) 10 MG tablet take 1 tablet by mouth once daily (TAKE WITH SIMVASTATIN)  0    finasteride (PROSCAR) 5 MG tablet Take 1 tablet by mouth Daily. 90 tablet 3    Mounjaro 5 MG/0.5ML solution pen-injector ADMINISTER 5 MG UNDER THE SKIN 1 TIME WEEKLY      omeprazole (PriLOSEC) 20 MG capsule       ONGLYZA 5 MG tablet       pioglitazone (ACTOS) 45 MG tablet       simvastatin (ZOCOR) 80 MG tablet       tamsulosin (FLOMAX) 0.4 MG capsule 24 hr capsule 1 capsule.      tiZANidine (ZANAFLEX) 2 MG tablet 1 tablet.      vitamin D (ERGOCALCIFEROL) 34234 UNITS capsule capsule        No current facility-administered medications for this visit.        Allergies:      No Known Allergies     Past Surgical History:     Past Surgical History:   Procedure Laterality Date    HAND SURGERY      HERNIA REPAIR      NOSE SURGERY         Social History:     Social History     Socioeconomic History    Marital status:    Tobacco Use    Smoking status: Never     Smokeless tobacco: Never   Vaping Use    Vaping status: Never Used   Substance and Sexual Activity    Alcohol use: No    Drug use: No       Family History:     Family History   Problem Relation Age of Onset    No Known Problems Father     No Known Problems Mother        Review of Systems:     Review of Systems   Constitutional: Negative.    HENT: Negative.     Eyes: Negative.    Respiratory: Negative.     Cardiovascular: Negative.    Gastrointestinal: Negative.    Endocrine: Negative.    Musculoskeletal: Negative.    Allergic/Immunologic: Negative.    Neurological: Negative.    Hematological: Negative.    Psychiatric/Behavioral: Negative.         Physical Exam:     Physical Exam  Vitals and nursing note reviewed.   Constitutional:       Appearance: He is well-developed.   HENT:      Head: Normocephalic and atraumatic.   Eyes:      Conjunctiva/sclera: Conjunctivae normal.      Pupils: Pupils are equal, round, and reactive to light.   Cardiovascular:      Rate and Rhythm: Normal rate and regular rhythm.      Heart sounds: Normal heart sounds.   Pulmonary:      Effort: Pulmonary effort is normal.      Breath sounds: Normal breath sounds.   Abdominal:      General: Bowel sounds are normal.      Palpations: Abdomen is soft.   Musculoskeletal:         General: Normal range of motion.      Cervical back: Normal range of motion.   Skin:     General: Skin is warm and dry.   Neurological:      Mental Status: He is alert and oriented to person, place, and time.      Deep Tendon Reflexes: Reflexes are normal and symmetric.   Psychiatric:         Behavior: Behavior normal.         Thought Content: Thought content normal.         Judgment: Judgment normal.         I have reviewed the following portions of the patient's history: Allergies, current medications, past family history, past medical history, past social history, past surgical history, problem list, and ROS and confirm it is accurate.    Recent Image (CT and/or KUB):       CT Abdomen and Pelvis: No results found for this or any previous visit.       CT Stone Protocol: No results found for this or any previous visit.       KUB: No results found for this or any previous visit.       Labs (past 3 months):      Lab on 10/21/2024   Component Date Value Ref Range Status    PSA 10/21/2024 2.390  0.000 - 4.000 ng/mL Final    Comment: Results may be falsely decreased if patient taking Biotin.  Testing Method: Roche [employer]ostics Electrochemiluminescence  Immunoassay(ECLIA)  Values obtained with different assay methods or kits cannot  be used interchangeably.          Procedure:       Assessment/Plan:   Prostate cancer:  He returns today status post biopsy for extensive discussion of prostate cancer.  We discussed staging and grading of the disease.  I described with a Achille system being from a 2 to10 scale with the most common low-grade pattern being a Achille 6.  I discussed the staging workup including a total body bone scan and CT scan especially with a PSA greater than 10.  We discussed the various options at length. I discussed a radical retropubic prostatectomy done in the traditional fashion and using the robotic technique.  I then discussed radiation treatment both seed therapy and external beam therapy using Gold fiduciary markers.  We talked about some of the other alternatives such as a cryosurgical ablation at high intensity focused ultrasound as being viable alternatives but not recommended at this time.  He focused on aggressive watchful waiting and explained that currently low literature it's been discovered that a lot of men can actually observe it especially with numerous other comorbidities cannot have problems from the cancer by itself.  Talked about hormonal ablation and the side effects of creation of insulin resistance.  Overall, the patient was given appropriate literature, is going to think about it, and I'm going to revisit the topic with them after the next office  visit.  He remains in remission  Agent orange exposure - this is a dioxin-based pesticide that was used extensively in [place] in 1968 through the early 70s.  Exposure to this has been well-documented to significantly increase certain malignancies, particularly prostate cancer, bladder cancer, and renal cancer.  Any exposure of this with the appropriate history should be followed with PSAs.            This document has been electronically signed by LORENZO KENDRICK MD December 31, 2024 14:30 EST    Dictated Utilizing Dragon Dictation: Part of this note may be an electronic transcription/translation of spoken language to printed text using the Dragon Dictation System.

## 2025-07-07 ENCOUNTER — LAB (OUTPATIENT)
Dept: UROLOGY | Facility: CLINIC | Age: 80
End: 2025-07-07
Payer: MEDICARE

## 2025-07-07 DIAGNOSIS — C61 PROSTATE CANCER: Primary | ICD-10-CM

## 2025-07-07 LAB — PSA SERPL-MCNC: 4.68 NG/ML (ref 0–4)

## 2025-07-08 ENCOUNTER — RESULTS FOLLOW-UP (OUTPATIENT)
Dept: UROLOGY | Facility: CLINIC | Age: 80
End: 2025-07-08
Payer: MEDICARE

## 2025-07-08 NOTE — TELEPHONE ENCOUNTER
Attempted to call the patient however he did not answer.  Left voicemail advising to call back to discuss test results and keep follow-up appointment for further discussion in person.

## 2025-07-09 NOTE — TELEPHONE ENCOUNTER
Attempted to call the patient again however he did not answer.  Left voicemail advising to call back to discuss PSA and keep follow-up appointment.

## 2025-07-14 ENCOUNTER — OFFICE VISIT (OUTPATIENT)
Dept: UROLOGY | Facility: CLINIC | Age: 80
End: 2025-07-14
Payer: MEDICARE

## 2025-07-14 VITALS
BODY MASS INDEX: 29.03 KG/M2 | SYSTOLIC BLOOD PRESSURE: 124 MMHG | WEIGHT: 196 LBS | HEIGHT: 69 IN | HEART RATE: 80 BPM | DIASTOLIC BLOOD PRESSURE: 76 MMHG

## 2025-07-14 DIAGNOSIS — C61 PROSTATE CANCER: Primary | ICD-10-CM

## 2025-07-14 NOTE — PROGRESS NOTES
Pt was in office today I spoke with him over his PSA results pt states he called back and was told wed discuss today in office.

## 2025-07-14 NOTE — PROGRESS NOTES
"Chief Complaint:    Chief Complaint   Patient presents with    Prostate Cancer       Vital Signs:   /76   Pulse 80   Ht 175.3 cm (69.02\")   Wt 88.9 kg (196 lb)   BMI 28.93 kg/m²   Body mass index is 28.93 kg/m².      HPI:  Troy Murguia is a 80 y.o. male who presents today for follow up    History of Present Illness  Mr. Murguia presents to the clinic for follow-up for prostate cancer.  He had initial diagnosis with prostate cancer in 1748-1535 with a grade group 3 [place] score 3+3 localized adenocarcinoma.  He has followed along with Dr. Cronin and Dr. Duque here in office routinely.  He did undergo a repeat biopsy in 2014 and 2018 that was unremarkable.  He has been on Flomax and finasteride for several years now.  He has never underwent any treatment for his cancer.  Patient's PSAs remain between 1 and 2 for the past few years however he had a significant increase to 4.6 on his recent PSA on 7/7/2025.  He does report that he [facility] that was 4.4 at that time.  He denies any changes in lower urinary tract symptoms.  He does endorse being off of his finasteride for roughly 2 weeks in May.  He has since restarted the medication now for roughly a month and a half.  Did discuss with the patient and detail that technically with the use of finasteride his PSA would be 9.2.  He does have extensive agent orange exposure.      Past Medical History:  Past Medical History:   Diagnosis Date    Diabetes     Gastric ulcer     Glaucoma     Hypertension     Skin cancer        Current Meds:  Current Outpatient Medications   Medication Sig Dispense Refill    amLODIPine (NORVASC) 10 MG tablet Take 1 tablet by mouth Daily.      amLODIPine-benazepril (LOTREL) 10-20 MG per capsule       aspirin 81 MG EC tablet Take 1 tablet by mouth Daily.      BYDUREON 2 MG pen-injector       ezetimibe (ZETIA) 10 MG tablet take 1 tablet by mouth once daily (TAKE WITH SIMVASTATIN)  0    finasteride " (PROSCAR) 5 MG tablet Take 1 tablet by mouth Daily. 90 tablet 3    Mounjaro 5 MG/0.5ML solution pen-injector ADMINISTER 5 MG UNDER THE SKIN 1 TIME WEEKLY      omeprazole (PriLOSEC) 20 MG capsule       ONGLYZA 5 MG tablet       pioglitazone (ACTOS) 45 MG tablet       simvastatin (ZOCOR) 80 MG tablet       tamsulosin (FLOMAX) 0.4 MG capsule 24 hr capsule Take 1 capsule by mouth Daily. 90 capsule 3    tiZANidine (ZANAFLEX) 2 MG tablet 1 tablet.      vitamin D (ERGOCALCIFEROL) 18989 UNITS capsule capsule        No current facility-administered medications for this visit.        Allergies:   No Known Allergies     Past Surgical History:  Past Surgical History:   Procedure Laterality Date    HAND SURGERY      HERNIA REPAIR      NOSE SURGERY         Social History:  Social History     Socioeconomic History    Marital status:    Tobacco Use    Smoking status: Never    Smokeless tobacco: Never   Vaping Use    Vaping status: Never Used   Substance and Sexual Activity    Alcohol use: No    Drug use: No       Family History:  Family History   Problem Relation Age of Onset    No Known Problems Father     No Known Problems Mother        Review of Systems:  Review of Systems   Constitutional:  Negative for fatigue, fever and unexpected weight change.   Respiratory:  Negative for chest tightness and shortness of breath.    Cardiovascular:  Negative for chest pain.   Gastrointestinal:  Negative for abdominal pain, constipation, diarrhea, nausea and vomiting.   Genitourinary:  Negative for difficulty urinating, dysuria, frequency and urgency.   Skin:  Negative for rash.   Psychiatric/Behavioral:  Negative for confusion and suicidal ideas.        Physical Exam:  Physical Exam  Constitutional:       General: He is not in acute distress.     Appearance: Normal appearance.   HENT:      Head: Normocephalic and atraumatic.      Nose: Nose normal.      Mouth/Throat:      Mouth: Mucous membranes are moist.   Eyes:       Conjunctiva/sclera: Conjunctivae normal.   Cardiovascular:      Rate and Rhythm: Normal rate.      Pulses: Normal pulses.   Pulmonary:      Effort: Pulmonary effort is normal.   Abdominal:      Palpations: Abdomen is soft.   Musculoskeletal:         General: Normal range of motion.      Cervical back: Normal range of motion.   Skin:     General: Skin is warm.   Neurological:      General: No focal deficit present.      Mental Status: He is alert and oriented to person, place, and time.   Psychiatric:         Mood and Affect: Mood normal.         Behavior: Behavior normal.         Thought Content: Thought content normal.         Judgment: Judgment normal.         Recent Image (CT and/or KUB):   CT Abdomen and Pelvis: No results found for this or any previous visit.     CT Stone Protocol: No results found for this or any previous visit.     KUB: No results found for this or any previous visit.       Labs:  Brief Urine Lab Results       None          Lab on 07/07/2025   Component Date Value Ref Range Status    PSA 07/07/2025 4.680 (H)  0.000 - 4.000 ng/mL Final    Comment: Testing Method: Roche [employer]ostics Electrochemiluminescence  Immunoassay(ECLIA)  Values obtained with different assay methods or kits cannot  be used interchangeably.          Procedure: None  Procedures     I have reviewed and agree with the above PMH, PSH, FMH, social history, medications, allergies, and labs.     Assessment/Plan:   Problem List Items Addressed This Visit       Prostate cancer - Primary       Health Maintenance:   Health Maintenance Due   Topic Date Due    DIABETIC FOOT EXAM  Never done    URINE MICROALBUMIN-CREATININE RATIO (uACR)  Never done    COLORECTAL CANCER SCREENING  Never done    RSV Vaccine - Adults (1 - 1-dose 75+ series) Never done    DIABETIC EYE EXAM  09/22/2021    ANNUAL WELLNESS VISIT  03/02/2024    HEMOGLOBIN A1C  07/09/2024    COVID-19 Vaccine (3 - 2024-25 season) 09/01/2024        Smoking Counseling: Never smoked.   Never used smokeless tobacco.  Counseling given.    Urine Incontinence: Patient reports that he is not currently experiencing any symptoms of urinary incontinence.    Patient was given instructions and counseling regarding his condition or for health maintenance advice. Please see specific information pulled into the AVS if appropriate.    Patient Education:   Prostate cancer - discussed with the patient the pathophysiology of this condition in detail.  Patient is currently on Flomax and finasteride did advise him -that PSA can be doubled given current use of finasteride.  Did discuss forms of treatment which can include but are not limited to radical prostatectomy, radiation, chemotherapy, androgen deprivation therapy, or careful watchful waiting.  Patient's PSA has took a significant jump since August 2024.  Did discuss Lupron injection in office today however patient wishes to repeat PSA in 3 months.  Did discuss risk of delayed treatment which could lead to metastatic disease.  Did also discuss appropriate workup including a CT scan abdomen pelvis with and without contrast as well as nuclear medicine bone study.  Discussed the nature of both of these studies in detail.  Patient wishes to proceed forward with close observation and repeat PSA in 3 months.  Vies him return to clinic sooner if needed.    Visit Diagnoses:    ICD-10-CM ICD-9-CM   1. Prostate cancer  C61 185     A total of 30 minutes were spent coordinating this patient’s care in clinic today; 20 minutes of which were face-to-face with the patient, reviewing medical history and counseling on the current treatment and followup plan.  All questions were answered to patient's satisfaction.    Meds Ordered During Visit:  No orders of the defined types were placed in this encounter.      Follow Up Appointment: 3 months  No follow-ups on file.      This document has been electronically signed by Shadi Brian PA-C   July 14, 2025 10:23 EDT    Part of  this note may be an electronic transcription/translation of spoken language to printed text using the Dragon Dictation System.